# Patient Record
Sex: MALE | URBAN - METROPOLITAN AREA
[De-identification: names, ages, dates, MRNs, and addresses within clinical notes are randomized per-mention and may not be internally consistent; named-entity substitution may affect disease eponyms.]

---

## 2024-05-08 ENCOUNTER — TELEPHONE (OUTPATIENT)
Dept: GASTROENTEROLOGY | Facility: CLINIC | Age: 34
End: 2024-05-08

## 2024-05-08 NOTE — TELEPHONE ENCOUNTER
Attempt made to reach  to scheduled new pt appointment per patient callback  message received from Anjana Shanks

## 2024-05-08 NOTE — TELEPHONE ENCOUNTER
----- Message from Anjana Shanks sent at 5/8/2024  9:45 AM CDT -----  Regarding: appt  Name of who is calling:    / Angel      What is the request in detail: Requesting a call back in ref to scheduling a new pt appt due to acid reflux and stomach issues / self pay      Can the clinic reply by [facility]:no      What number to call back if not [facility]:[phone]

## 2024-05-08 NOTE — TELEPHONE ENCOUNTER
----- Message from Anjana Shanks sent at 5/8/2024  9:45 AM CDT -----  Regarding: appt  Name of who is calling:    / Angel      What is the request in detail: Requesting a call back in ref to scheduling a new pt appt due to acid reflux and stomach issues / self pay      Can the clinic reply by [facility]:no      What number to call back if not [facility]:[phone]